# Patient Record
Sex: FEMALE | URBAN - METROPOLITAN AREA
[De-identification: names, ages, dates, MRNs, and addresses within clinical notes are randomized per-mention and may not be internally consistent; named-entity substitution may affect disease eponyms.]

---
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
## 2023-09-28 ENCOUNTER — OFFICE VISIT (OUTPATIENT)
Age: 18
End: 2023-09-28

## 2023-09-28 VITALS
RESPIRATION RATE: 20 BRPM | HEART RATE: 96 BPM | TEMPERATURE: 98.3 F | SYSTOLIC BLOOD PRESSURE: 116 MMHG | DIASTOLIC BLOOD PRESSURE: 74 MMHG | HEIGHT: 65 IN | OXYGEN SATURATION: 99 % | BODY MASS INDEX: 24.99 KG/M2 | WEIGHT: 150 LBS

## 2023-09-28 DIAGNOSIS — L70.0 ACNE VULGARIS: ICD-10-CM

## 2023-09-28 DIAGNOSIS — F41.9 ANXIETY: ICD-10-CM

## 2023-09-28 DIAGNOSIS — R07.9 CHEST PAIN, UNSPECIFIED TYPE: Primary | ICD-10-CM

## 2023-09-28 RX ORDER — METHYLPHENIDATE 17.3 MG/1
TABLET, ORALLY DISINTEGRATING ORAL
COMMUNITY

## 2023-09-28 RX ORDER — DOXYCYCLINE HYCLATE 100 MG
100 TABLET ORAL 2 TIMES DAILY
Qty: 28 TABLET | Refills: 2 | Status: SHIPPED | OUTPATIENT
Start: 2023-09-28 | End: 2023-10-12

## 2023-09-28 NOTE — PROGRESS NOTES
Hien Serrano ( 2005) is a 19 y.o. female, New Patient patient, here for evaluation of the following chief complaint(s):  Chest Pain (Started on Tuesday and has progressed from the middle to the left side)     No SOB, no diaphoresis, not radiating. Information provided by, or accompanied by:   patient    ASSESSMENT/PLAN:  Johanna De La O was seen today for chest pain. Diagnoses and all orders for this visit:    Chest pain, unspecified type  -     AMB POC EKG ROUTINE W/ 12 LEADS, SCREEN ()    Acne vulgaris  -     doxycycline hyclate (VIBRA-TABS) 100 MG tablet; Take 1 tablet by mouth 2 times daily for 14 days    Anxiety           Return in about 1 week (around 10/5/2023), or if symptoms worsen or fail to improve, for Anxiety/chest pain at her mental health provider. [facility] History provided by:  Patient  Chest Pain   This is a new problem. Episode onset: 3 days. The onset quality is sudden. The problem occurs intermittently. The problem has been unchanged. The pain is present in the substernal region. The quality of the pain is described as dull. The pain does not radiate. Review of Systems   Cardiovascular:  Positive for chest pain. Subjective:   Pt is a 19 y.o. female     No past medical history on file. No past surgical history on file. No results found for this visit on 09/28/23. Objective:     Physical Exam  Vitals (Happy , smiles, laughs.) and nursing note reviewed. Constitutional:       General: She is not in acute distress. Appearance: Normal appearance. She is not ill-appearing, toxic-appearing or diaphoretic. HENT:      Head: Normocephalic and atraumatic. Nose: Nose normal.   Eyes:      Extraocular Movements: Extraocular movements intact. Conjunctiva/sclera: Conjunctivae normal.   Cardiovascular:      Rate and Rhythm: Normal rate. Heart sounds: Normal heart sounds.    Pulmonary:      Effort: Pulmonary effort is normal.      Breath sounds: Normal